# Patient Record
Sex: MALE | Race: BLACK OR AFRICAN AMERICAN | Employment: UNEMPLOYED | URBAN - METROPOLITAN AREA
[De-identification: names, ages, dates, MRNs, and addresses within clinical notes are randomized per-mention and may not be internally consistent; named-entity substitution may affect disease eponyms.]

---

## 2018-07-13 PROBLEM — K81.0 ACUTE CHOLECYSTITIS: Status: ACTIVE | Noted: 2018-07-13

## 2019-05-10 ENCOUNTER — HOSPITAL ENCOUNTER (EMERGENCY)
Age: 24
Discharge: HOME | End: 2019-05-10
Attending: EMERGENCY MEDICINE
Payer: MEDICAID

## 2019-05-10 VITALS
HEIGHT: 75 IN | TEMPERATURE: 98.2 F | OXYGEN SATURATION: 98 % | BODY MASS INDEX: 36.43 KG/M2 | DIASTOLIC BLOOD PRESSURE: 87 MMHG | RESPIRATION RATE: 18 BRPM | HEART RATE: 86 BPM | WEIGHT: 292.99 LBS | SYSTOLIC BLOOD PRESSURE: 133 MMHG

## 2019-05-10 DIAGNOSIS — J02.9 ACUTE PHARYNGITIS, UNSPECIFIED ETIOLOGY: Primary | ICD-10-CM

## 2019-05-10 DIAGNOSIS — J01.90 ACUTE NON-RECURRENT SINUSITIS, UNSPECIFIED LOCATION: ICD-10-CM

## 2019-05-10 PROCEDURE — 99282 EMERGENCY DEPT VISIT SF MDM: CPT

## 2019-05-10 RX ORDER — AZITHROMYCIN 250 MG/1
TABLET, FILM COATED ORAL
Qty: 1 PACKET | Refills: 0 | Status: SHIPPED | OUTPATIENT
Start: 2019-05-10 | End: 2019-05-14
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
## 2019-06-03 ENCOUNTER — HOSPITAL ENCOUNTER (EMERGENCY)
Age: 24
Discharge: HOME | End: 2019-06-03
Attending: EMERGENCY MEDICINE
Payer: MEDICAID

## 2019-06-03 VITALS
DIASTOLIC BLOOD PRESSURE: 85 MMHG | RESPIRATION RATE: 18 BRPM | WEIGHT: 293.21 LBS | SYSTOLIC BLOOD PRESSURE: 126 MMHG | OXYGEN SATURATION: 96 % | TEMPERATURE: 97.5 F | BODY MASS INDEX: 36.65 KG/M2 | HEART RATE: 75 BPM

## 2019-06-03 DIAGNOSIS — L23.7 POISON IVY DERMATITIS: Primary | ICD-10-CM

## 2019-06-03 PROCEDURE — 99282 EMERGENCY DEPT VISIT SF MDM: CPT

## 2019-06-03 PROCEDURE — 6370000000 HC RX 637 (ALT 250 FOR IP): Performed by: EMERGENCY MEDICINE

## 2019-06-03 RX ORDER — FAMOTIDINE 20 MG/1
20 TABLET, FILM COATED ORAL 2 TIMES DAILY
Qty: 28 TABLET | Refills: 0 | Status: SHIPPED | OUTPATIENT
Start: 2019-06-03 | End: 2019-07-08 | Stop reason: ALTCHOICE

## 2019-06-03 RX ORDER — DIPHENHYDRAMINE HCL 25 MG
25 CAPSULE ORAL EVERY 6 HOURS PRN
Qty: 12 CAPSULE | Refills: 0 | Status: SHIPPED | OUTPATIENT
Start: 2019-06-03 | End: 2019-06-13

## 2019-06-03 RX ORDER — PREDNISONE 20 MG/1
60 TABLET ORAL ONCE
Status: COMPLETED | OUTPATIENT
Start: 2019-06-03 | End: 2019-06-03

## 2019-06-03 RX ORDER — PREDNISONE 10 MG/1
TABLET ORAL
Qty: 32 TABLET | Refills: 0 | Status: SHIPPED | OUTPATIENT
Start: 2019-06-03 | End: 2019-06-13

## 2019-06-03 RX ORDER — TRIAMCINOLONE ACETONIDE 0.25 MG/G
OINTMENT TOPICAL
Qty: 1 TUBE | Refills: 1 | Status: SHIPPED | OUTPATIENT
Start: 2019-06-03 | End: 2019-06-10

## 2019-06-03 RX ADMIN — PREDNISONE 60 MG: 20 TABLET ORAL at 06:42

## 2019-06-03 ASSESSMENT — PAIN DESCRIPTION - DESCRIPTORS: DESCRIPTORS: ITCHING;BURNING

## 2019-06-03 ASSESSMENT — PAIN DESCRIPTION - LOCATION: LOCATION: ARM;FACE

## 2019-06-03 ASSESSMENT — PAIN DESCRIPTION - FREQUENCY: FREQUENCY: INTERMITTENT

## 2019-06-03 ASSESSMENT — PAIN SCALES - GENERAL
PAINLEVEL_OUTOF10: 8
PAINLEVEL_OUTOF10: 8

## 2019-06-03 NOTE — ED NOTES
Discharge instructions given voiced understanding pt request ed a work note .  And was given one     Elodia Fuentes RN  06/03/19 9734
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
## 2019-07-08 ENCOUNTER — HOSPITAL ENCOUNTER (EMERGENCY)
Age: 24
Discharge: HOME | End: 2019-07-08
Attending: EMERGENCY MEDICINE
Payer: MEDICAID

## 2019-07-08 VITALS
RESPIRATION RATE: 17 BRPM | SYSTOLIC BLOOD PRESSURE: 133 MMHG | WEIGHT: 300.71 LBS | HEIGHT: 75 IN | OXYGEN SATURATION: 96 % | TEMPERATURE: 97.7 F | HEART RATE: 65 BPM | DIASTOLIC BLOOD PRESSURE: 92 MMHG | BODY MASS INDEX: 37.39 KG/M2

## 2019-07-08 DIAGNOSIS — R21 RASH AND OTHER NONSPECIFIC SKIN ERUPTION: Primary | ICD-10-CM

## 2019-07-08 PROCEDURE — 99282 EMERGENCY DEPT VISIT SF MDM: CPT

## 2019-07-08 RX ORDER — PREDNISONE 20 MG/1
TABLET ORAL
Qty: 18 TABLET | Refills: 0 | Status: SHIPPED | OUTPATIENT
Start: 2019-07-08 | End: 2019-07-18

## 2019-07-09 ASSESSMENT — ENCOUNTER SYMPTOMS
EYE REDNESS: 0
EYE PAIN: 0
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
## 2019-12-07 ENCOUNTER — HOSPITAL ENCOUNTER (EMERGENCY)
Age: 24
Discharge: HOME | End: 2019-12-07
Attending: EMERGENCY MEDICINE

## 2019-12-07 VITALS
WEIGHT: 306.66 LBS | BODY MASS INDEX: 38.13 KG/M2 | SYSTOLIC BLOOD PRESSURE: 124 MMHG | OXYGEN SATURATION: 97 % | HEART RATE: 75 BPM | HEIGHT: 75 IN | TEMPERATURE: 98.4 F | DIASTOLIC BLOOD PRESSURE: 84 MMHG | RESPIRATION RATE: 16 BRPM

## 2019-12-07 DIAGNOSIS — J45.901 MILD ASTHMA WITH EXACERBATION, UNSPECIFIED WHETHER PERSISTENT: Primary | ICD-10-CM

## 2019-12-07 PROCEDURE — 99283 EMERGENCY DEPT VISIT LOW MDM: CPT

## 2019-12-07 RX ORDER — ALBUTEROL SULFATE 90 UG/1
2 AEROSOL, METERED RESPIRATORY (INHALATION) EVERY 6 HOURS PRN
COMMUNITY
End: 2020-03-25 | Stop reason: ALTCHOICE

## 2019-12-07 RX ORDER — ALBUTEROL SULFATE 90 UG/1
2 AEROSOL, METERED RESPIRATORY (INHALATION) EVERY 4 HOURS PRN
Qty: 1 INHALER | Refills: 0 | Status: SHIPPED | OUTPATIENT
Start: 2019-12-07 | End: 2020-03-25 | Stop reason: ALTCHOICE

## 2019-12-07 ASSESSMENT — ENCOUNTER SYMPTOMS
DIARRHEA: 0
SORE THROAT: 0
RHINORRHEA: 0
NAUSEA: 0
VOMITING: 0
WHEEZING: 0
ABDOMINAL PAIN: 0
CHEST TIGHTNESS: 1
COUGH: 1

## 2019-12-08 ENCOUNTER — APPOINTMENT (OUTPATIENT)
Dept: GENERAL RADIOLOGY | Age: 24
End: 2019-12-08

## 2019-12-08 ENCOUNTER — HOSPITAL ENCOUNTER (EMERGENCY)
Age: 24
Discharge: HOME | End: 2019-12-08
Attending: EMERGENCY MEDICINE

## 2019-12-08 VITALS
HEART RATE: 97 BPM | TEMPERATURE: 98.4 F | SYSTOLIC BLOOD PRESSURE: 121 MMHG | WEIGHT: 306 LBS | RESPIRATION RATE: 16 BRPM | BODY MASS INDEX: 38.05 KG/M2 | OXYGEN SATURATION: 98 % | HEIGHT: 75 IN | DIASTOLIC BLOOD PRESSURE: 79 MMHG

## 2019-12-08 DIAGNOSIS — J40 BRONCHITIS: Primary | ICD-10-CM

## 2019-12-08 PROCEDURE — 71046 X-RAY EXAM CHEST 2 VIEWS: CPT

## 2019-12-08 PROCEDURE — 99283 EMERGENCY DEPT VISIT LOW MDM: CPT

## 2019-12-08 PROCEDURE — 6370000000 HC RX 637 (ALT 250 FOR IP): Performed by: EMERGENCY MEDICINE

## 2019-12-08 RX ORDER — BENZONATATE 100 MG/1
100 CAPSULE ORAL 3 TIMES DAILY PRN
Qty: 30 CAPSULE | Refills: 0 | Status: SHIPPED | OUTPATIENT
Start: 2019-12-08 | End: 2019-12-15

## 2019-12-08 RX ORDER — IPRATROPIUM BROMIDE AND ALBUTEROL SULFATE 2.5; .5 MG/3ML; MG/3ML
1 SOLUTION RESPIRATORY (INHALATION) ONCE
Status: COMPLETED | OUTPATIENT
Start: 2019-12-08 | End: 2019-12-08

## 2019-12-08 RX ORDER — AZITHROMYCIN 250 MG/1
TABLET, FILM COATED ORAL
Qty: 1 PACKET | Refills: 0 | Status: SHIPPED | OUTPATIENT
Start: 2019-12-08 | End: 2019-12-12

## 2019-12-08 RX ADMIN — IPRATROPIUM BROMIDE AND ALBUTEROL SULFATE 1 AMPULE: 2.5; .5 SOLUTION RESPIRATORY (INHALATION) at 08:23

## 2019-12-08 ASSESSMENT — PAIN SCALES - GENERAL
PAINLEVEL_OUTOF10: 6
PAINLEVEL_OUTOF10: 8
PAINLEVEL_OUTOF10: 5

## 2019-12-08 ASSESSMENT — PAIN DESCRIPTION - DESCRIPTORS
DESCRIPTORS: HEAVINESS

## 2019-12-08 ASSESSMENT — ENCOUNTER SYMPTOMS
VOMITING: 0
NAUSEA: 0
ABDOMINAL PAIN: 0
CHEST TIGHTNESS: 1
SORE THROAT: 0
COUGH: 1
SHORTNESS OF BREATH: 1
DIARRHEA: 0

## 2019-12-08 ASSESSMENT — PAIN DESCRIPTION - FREQUENCY: FREQUENCY: INTERMITTENT

## 2019-12-08 ASSESSMENT — PAIN DESCRIPTION - LOCATION
LOCATION: CHEST

## 2020-03-25 ENCOUNTER — HOSPITAL ENCOUNTER (EMERGENCY)
Age: 25
Discharge: HOME | End: 2020-03-25
Attending: EMERGENCY MEDICINE

## 2020-03-25 ENCOUNTER — APPOINTMENT (OUTPATIENT)
Dept: GENERAL RADIOLOGY | Age: 25
End: 2020-03-25

## 2020-03-25 VITALS
TEMPERATURE: 98.7 F | OXYGEN SATURATION: 98 % | BODY MASS INDEX: 37.55 KG/M2 | HEIGHT: 75 IN | DIASTOLIC BLOOD PRESSURE: 88 MMHG | WEIGHT: 302.03 LBS | RESPIRATION RATE: 16 BRPM | HEART RATE: 74 BPM | SYSTOLIC BLOOD PRESSURE: 148 MMHG

## 2020-03-25 PROCEDURE — 99283 EMERGENCY DEPT VISIT LOW MDM: CPT

## 2020-03-25 PROCEDURE — 71046 X-RAY EXAM CHEST 2 VIEWS: CPT

## 2020-03-25 RX ORDER — BENZONATATE 100 MG/1
100 CAPSULE ORAL 3 TIMES DAILY PRN
Qty: 21 CAPSULE | Refills: 0 | Status: SHIPPED | OUTPATIENT
Start: 2020-03-25 | End: 2020-04-01

## 2020-03-25 RX ORDER — ALBUTEROL SULFATE 90 UG/1
2 AEROSOL, METERED RESPIRATORY (INHALATION) 4 TIMES DAILY PRN
Qty: 1 INHALER | Refills: 0 | Status: SHIPPED | OUTPATIENT
Start: 2020-03-25 | End: 2020-08-31
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
## 2020-07-27 ENCOUNTER — HOSPITAL ENCOUNTER (EMERGENCY)
Age: 25
Discharge: HOME | End: 2020-07-27
Attending: EMERGENCY MEDICINE

## 2020-07-27 VITALS
WEIGHT: 295.2 LBS | HEART RATE: 74 BPM | SYSTOLIC BLOOD PRESSURE: 135 MMHG | RESPIRATION RATE: 18 BRPM | BODY MASS INDEX: 36.9 KG/M2 | DIASTOLIC BLOOD PRESSURE: 89 MMHG | TEMPERATURE: 98.9 F | OXYGEN SATURATION: 98 %

## 2020-07-27 PROCEDURE — 99282 EMERGENCY DEPT VISIT SF MDM: CPT

## 2020-07-27 RX ORDER — PREDNISONE 10 MG/1
TABLET ORAL
Qty: 30 TABLET | Refills: 0 | Status: SHIPPED | OUTPATIENT
Start: 2020-07-27 | End: 2020-08-31
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
## 2020-07-27 NOTE — LETTER
[facility] [address]  Phone: [phone]               July 27, 2020    Patient: Drea Christensen   YOB: 1995   Date of Visit: 7/27/2020       To Whom It May Concern:    Jacquelyn Dietz was seen and treated in our emergency department on 7/27/2020. He may return to work on 7/28/20.       Sincerely,       Mark Bence, MD         Signature:__________________________________

## 2020-08-31 ENCOUNTER — HOSPITAL ENCOUNTER (EMERGENCY)
Age: 25
Discharge: HOME | End: 2020-08-31
Attending: EMERGENCY MEDICINE

## 2020-08-31 VITALS
BODY MASS INDEX: 35.56 KG/M2 | TEMPERATURE: 97.7 F | HEART RATE: 71 BPM | SYSTOLIC BLOOD PRESSURE: 130 MMHG | RESPIRATION RATE: 12 BRPM | DIASTOLIC BLOOD PRESSURE: 92 MMHG | WEIGHT: 286 LBS | OXYGEN SATURATION: 100 % | HEIGHT: 75 IN

## 2020-08-31 LAB
A/G RATIO: 1.2 (ref 1.1–2.2)
ALBUMIN SERPL-MCNC: 4.6 G/DL (ref 3.4–5)
ALP BLD-CCNC: 112 U/L (ref 40–129)
ALT SERPL-CCNC: 31 U/L (ref 10–40)
ANION GAP SERPL CALCULATED.3IONS-SCNC: 15 MMOL/L (ref 3–16)
AST SERPL-CCNC: 19 U/L (ref 15–37)
BACTERIA: ABNORMAL /HPF
BASOPHILS ABSOLUTE: 0.1 K/UL (ref 0–0.2)
BASOPHILS RELATIVE PERCENT: 0.5 %
BILIRUB SERPL-MCNC: 0.8 MG/DL (ref 0–1)
BILIRUBIN URINE: NEGATIVE
BLOOD, URINE: ABNORMAL
BUN BLDV-MCNC: 12 MG/DL (ref 7–20)
CALCIUM SERPL-MCNC: 9.8 MG/DL (ref 8.3–10.6)
CHLORIDE BLD-SCNC: 102 MMOL/L (ref 99–110)
CLARITY: ABNORMAL
CO2: 20 MMOL/L (ref 21–32)
COLOR: ABNORMAL
CREAT SERPL-MCNC: 1.1 MG/DL (ref 0.9–1.3)
EOSINOPHILS ABSOLUTE: 0.1 K/UL (ref 0–0.6)
EOSINOPHILS RELATIVE PERCENT: 0.3 %
EPITHELIAL CELLS, UA: ABNORMAL /HPF (ref 0–5)
GFR AFRICAN AMERICAN: >60
GFR NON-AFRICAN AMERICAN: >60
GLOBULIN: 3.7 G/DL
GLUCOSE BLD-MCNC: 112 MG/DL (ref 70–99)
GLUCOSE URINE: NEGATIVE MG/DL
HCT VFR BLD CALC: 46.7 % (ref 40.5–52.5)
HEMOGLOBIN: 15.9 G/DL (ref 13.5–17.5)
KETONES, URINE: NEGATIVE MG/DL
LEUKOCYTE ESTERASE, URINE: NEGATIVE
LIPASE: 40 U/L (ref 13–60)
LYMPHOCYTES ABSOLUTE: 4 K/UL (ref 1–5.1)
LYMPHOCYTES RELATIVE PERCENT: 24.7 %
MCH RBC QN AUTO: 30.2 PG (ref 26–34)
MCHC RBC AUTO-ENTMCNC: 34.1 G/DL (ref 31–36)
MCV RBC AUTO: 88.7 FL (ref 80–100)
MICROSCOPIC EXAMINATION: YES
MONOCYTES ABSOLUTE: 1.4 K/UL (ref 0–1.3)
MONOCYTES RELATIVE PERCENT: 8.4 %
NEUTROPHILS ABSOLUTE: 10.8 K/UL (ref 1.7–7.7)
NEUTROPHILS RELATIVE PERCENT: 66.1 %
NITRITE, URINE: NEGATIVE
PDW BLD-RTO: 13.7 % (ref 12.4–15.4)
PH UA: 5.5 (ref 5–8)
PLATELET # BLD: 379 K/UL (ref 135–450)
PMV BLD AUTO: 7.2 FL (ref 5–10.5)
POTASSIUM REFLEX MAGNESIUM: 3.9 MMOL/L (ref 3.5–5.1)
PROTEIN UA: NEGATIVE MG/DL
RBC # BLD: 5.27 M/UL (ref 4.2–5.9)
RBC UA: ABNORMAL /HPF (ref 0–4)
SODIUM BLD-SCNC: 137 MMOL/L (ref 136–145)
SPECIFIC GRAVITY UA: >=1.03 (ref 1–1.03)
TOTAL PROTEIN: 8.3 G/DL (ref 6.4–8.2)
URINE REFLEX TO CULTURE: ABNORMAL
URINE TRICHOMONAS EVALUATION: NORMAL
URINE TYPE: ABNORMAL
UROBILINOGEN, URINE: 0.2 E.U./DL
WBC # BLD: 16.3 K/UL (ref 4–11)
WBC UA: ABNORMAL /HPF (ref 0–5)

## 2020-08-31 PROCEDURE — 6370000000 HC RX 637 (ALT 250 FOR IP): Performed by: EMERGENCY MEDICINE

## 2020-08-31 PROCEDURE — 80053 COMPREHEN METABOLIC PANEL: CPT

## 2020-08-31 PROCEDURE — 87491 CHLMYD TRACH DNA AMP PROBE: CPT

## 2020-08-31 PROCEDURE — 81001 URINALYSIS AUTO W/SCOPE: CPT

## 2020-08-31 PROCEDURE — 96372 THER/PROPH/DIAG INJ SC/IM: CPT

## 2020-08-31 PROCEDURE — 36415 COLL VENOUS BLD VENIPUNCTURE: CPT

## 2020-08-31 PROCEDURE — 87591 N.GONORRHOEAE DNA AMP PROB: CPT

## 2020-08-31 PROCEDURE — 6360000002 HC RX W HCPCS: Performed by: EMERGENCY MEDICINE

## 2020-08-31 PROCEDURE — 83690 ASSAY OF LIPASE: CPT

## 2020-08-31 PROCEDURE — 85025 COMPLETE CBC W/AUTO DIFF WBC: CPT

## 2020-08-31 PROCEDURE — 99283 EMERGENCY DEPT VISIT LOW MDM: CPT

## 2020-08-31 RX ORDER — IBUPROFEN 600 MG/1
600 TABLET ORAL EVERY 6 HOURS PRN
Qty: 40 TABLET | Refills: 0 | Status: SHIPPED | OUTPATIENT
Start: 2020-08-31 | End: 2021-01-31

## 2020-08-31 RX ORDER — TAMSULOSIN HYDROCHLORIDE 0.4 MG/1
0.4 CAPSULE ORAL DAILY
Qty: 5 CAPSULE | Refills: 0 | Status: SHIPPED | OUTPATIENT
Start: 2020-08-31 | End: 2021-01-31

## 2020-08-31 RX ORDER — HYDROCODONE BITARTRATE AND ACETAMINOPHEN 5; 325 MG/1; MG/1
1 TABLET ORAL EVERY 6 HOURS PRN
Qty: 10 TABLET | Refills: 0 | Status: SHIPPED | OUTPATIENT
Start: 2020-08-31 | End: 2020-09-03

## 2020-08-31 RX ORDER — KETOROLAC TROMETHAMINE 30 MG/ML
30 INJECTION, SOLUTION INTRAMUSCULAR; INTRAVENOUS ONCE
Status: COMPLETED | OUTPATIENT
Start: 2020-08-31 | End: 2020-08-31

## 2020-08-31 RX ORDER — ONDANSETRON 4 MG/1
4 TABLET, ORALLY DISINTEGRATING ORAL ONCE
Status: COMPLETED | OUTPATIENT
Start: 2020-08-31 | End: 2020-08-31

## 2020-08-31 RX ORDER — HYDROCODONE BITARTRATE AND ACETAMINOPHEN 5; 325 MG/1; MG/1
1 TABLET ORAL ONCE
Status: COMPLETED | OUTPATIENT
Start: 2020-08-31 | End: 2020-08-31

## 2020-08-31 RX ADMIN — ONDANSETRON 4 MG: 4 TABLET, ORALLY DISINTEGRATING ORAL at 08:06

## 2020-08-31 RX ADMIN — KETOROLAC TROMETHAMINE 30 MG: 30 INJECTION, SOLUTION INTRAMUSCULAR at 07:27

## 2020-08-31 RX ADMIN — HYDROCODONE BITARTRATE AND ACETAMINOPHEN 1 TABLET: 5; 325 TABLET ORAL at 08:06

## 2020-08-31 ASSESSMENT — PAIN SCALES - GENERAL
PAINLEVEL_OUTOF10: 9
PAINLEVEL_OUTOF10: 2
PAINLEVEL_OUTOF10: 9
PAINLEVEL_OUTOF10: 8

## 2020-08-31 ASSESSMENT — PAIN DESCRIPTION - ORIENTATION
ORIENTATION: LEFT
ORIENTATION: LEFT

## 2020-08-31 ASSESSMENT — PAIN DESCRIPTION - LOCATION
LOCATION: GROIN
LOCATION: ABDOMEN;SCROTUM

## 2020-08-31 ASSESSMENT — PAIN DESCRIPTION - PAIN TYPE
TYPE: ACUTE PAIN
TYPE: ACUTE PAIN

## 2020-08-31 ASSESSMENT — PAIN DESCRIPTION - DESCRIPTORS: DESCRIPTORS: SHARP

## 2020-08-31 ASSESSMENT — PAIN - FUNCTIONAL ASSESSMENT: PAIN_FUNCTIONAL_ASSESSMENT: 0-10
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
## 2020-08-31 NOTE — ED NOTES
Patient states his pain is less in his side and abd, more towards his groin     Odalis Ramos RN  08/31/20 4071

## 2020-09-09 LAB
C. TRACHOMATIS DNA ,URINE: NEGATIVE
N. GONORRHOEAE DNA, URINE: NEGATIVE

## 2021-01-31 ENCOUNTER — APPOINTMENT (OUTPATIENT)
Dept: CT IMAGING | Age: 26
End: 2021-01-31

## 2021-01-31 ENCOUNTER — HOSPITAL ENCOUNTER (EMERGENCY)
Age: 26
Discharge: HOME | End: 2021-01-31
Attending: EMERGENCY MEDICINE

## 2021-01-31 VITALS
HEIGHT: 75 IN | HEART RATE: 99 BPM | TEMPERATURE: 97.6 F | RESPIRATION RATE: 12 BRPM | OXYGEN SATURATION: 99 % | BODY MASS INDEX: 37.18 KG/M2 | DIASTOLIC BLOOD PRESSURE: 80 MMHG | WEIGHT: 299 LBS | SYSTOLIC BLOOD PRESSURE: 153 MMHG

## 2021-01-31 DIAGNOSIS — S29.019A THORACIC MYOFASCIAL STRAIN, INITIAL ENCOUNTER: Primary | ICD-10-CM

## 2021-01-31 PROCEDURE — 72128 CT CHEST SPINE W/O DYE: CPT

## 2021-01-31 PROCEDURE — 99284 EMERGENCY DEPT VISIT MOD MDM: CPT

## 2021-01-31 PROCEDURE — 6370000000 HC RX 637 (ALT 250 FOR IP): Performed by: EMERGENCY MEDICINE

## 2021-01-31 RX ORDER — METHYLPREDNISOLONE 4 MG/1
TABLET ORAL
Qty: 1 KIT | Refills: 0 | Status: SHIPPED | OUTPATIENT
Start: 2021-01-31 | End: 2021-03-26 | Stop reason: ALTCHOICE

## 2021-01-31 RX ORDER — CYCLOBENZAPRINE HCL 10 MG
10 TABLET ORAL 3 TIMES DAILY PRN
Qty: 21 TABLET | Refills: 0 | Status: SHIPPED | OUTPATIENT
Start: 2021-01-31 | End: 2021-02-07

## 2021-01-31 RX ORDER — IBUPROFEN 600 MG/1
600 TABLET ORAL EVERY 6 HOURS PRN
Qty: 40 TABLET | Refills: 0 | Status: SHIPPED | OUTPATIENT
Start: 2021-01-31 | End: 2021-09-19 | Stop reason: SDUPTHER

## 2021-01-31 RX ORDER — ACETAMINOPHEN 500 MG
1000 TABLET ORAL ONCE
Status: COMPLETED | OUTPATIENT
Start: 2021-01-31 | End: 2021-01-31

## 2021-01-31 RX ADMIN — ACETAMINOPHEN 1000 MG: 500 TABLET ORAL at 15:20

## 2021-01-31 ASSESSMENT — PAIN DESCRIPTION - LOCATION
LOCATION: BACK
LOCATION: BACK

## 2021-01-31 ASSESSMENT — PAIN DESCRIPTION - PAIN TYPE: TYPE: ACUTE PAIN

## 2021-01-31 ASSESSMENT — PAIN DESCRIPTION - DESCRIPTORS: DESCRIPTORS: STABBING
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
## 2021-03-26 ENCOUNTER — HOSPITAL ENCOUNTER (EMERGENCY)
Age: 26
Discharge: HOME | End: 2021-03-26
Attending: STUDENT IN AN ORGANIZED HEALTH CARE EDUCATION/TRAINING PROGRAM

## 2021-03-26 VITALS
TEMPERATURE: 98.2 F | RESPIRATION RATE: 17 BRPM | HEIGHT: 75 IN | OXYGEN SATURATION: 98 % | DIASTOLIC BLOOD PRESSURE: 79 MMHG | SYSTOLIC BLOOD PRESSURE: 147 MMHG | WEIGHT: 311.51 LBS | HEART RATE: 83 BPM | BODY MASS INDEX: 38.73 KG/M2

## 2021-03-26 DIAGNOSIS — J20.9 ACUTE BRONCHITIS, UNSPECIFIED ORGANISM: Primary | ICD-10-CM

## 2021-03-26 PROCEDURE — 99283 EMERGENCY DEPT VISIT LOW MDM: CPT

## 2021-03-26 RX ORDER — PREDNISONE 50 MG/1
50 TABLET ORAL DAILY
Qty: 5 TABLET | Refills: 0 | Status: SHIPPED | OUTPATIENT
Start: 2021-03-26 | End: 2021-03-31

## 2021-03-26 RX ORDER — ALBUTEROL SULFATE 90 UG/1
2 AEROSOL, METERED RESPIRATORY (INHALATION) 4 TIMES DAILY PRN
Qty: 1 INHALER | Refills: 0 | Status: SHIPPED | OUTPATIENT
Start: 2021-03-26

## 2021-03-26 RX ORDER — BENZONATATE 100 MG/1
100 CAPSULE ORAL 2 TIMES DAILY PRN
Qty: 14 CAPSULE | Refills: 0 | Status: SHIPPED | OUTPATIENT
Start: 2021-03-26 | End: 2021-04-02

## 2021-03-26 NOTE — ED NOTES
AVS reviewed with patient. Verbalized understanding. AVS was printed and given to patient.        Kae Quezada RN  03/26/21 0335

## 2021-03-26 NOTE — ED PROVIDER NOTES
History:   Procedure Laterality Date    ADENOIDECTOMY  age 5    CHOLECYSTECTOMY  07/13/2018    TEAR DUCT SURGERY  1996 x 2    TONSILLECTOMY  age 5         CURRENT MEDICATIONS       Previous Medications    IBUPROFEN (ADVIL;MOTRIN) 600 MG TABLET    Take 1 tablet by mouth every 6 hours as needed for Pain       ALLERGIES     Duricef [cefadroxil monohydrate]    FAMILY HISTORY       Family History   Problem Relation Age of Onset    Arthritis Mother     Depression Mother     High Blood Pressure Mother     Mental Illness Mother     Stroke Mother     Asthma Sister     Depression Sister     Learning Disabilities Sister     Mental Illness Sister     Asthma Brother     Depression Brother     High Cholesterol Brother     Mental Illness Brother     High Cholesterol Maternal Uncle     Stroke Maternal Uncle     Arthritis Maternal Grandmother     Cancer Maternal Grandmother     High Blood Pressure Maternal Grandmother     Cancer Maternal Grandfather     High Blood Pressure Maternal Grandfather     Mental Illness Maternal Grandfather     Arthritis Paternal Grandmother     High Blood Pressure Paternal Grandmother           SOCIAL HISTORY       Social History     Socioeconomic History    Marital status: Single     Spouse name: Not on file    Number of children: Not on file    Years of education: Not on file    Highest education level: Not on file   Occupational History    Occupation: student   Social Needs    Financial resource strain: Not on file    Food insecurity     Worry: Not on file     Inability: Not on file   [employer] needs     Medical: Not on file     Non-medical: Not on file   Tobacco Use    Smoking status: Former Smoker     Types: Cigars    Smokeless tobacco: Former User   Substance and Sexual Activity    Alcohol use: Yes     Comment: rare    Drug use: Not Currently     Frequency: 20.0 times per week     Types: Marijuana     Comment: stopped 2019    Sexual activity: Yes Partners: Female   Lifestyle    Physical activity     Days per week: Not on file     Minutes per session: Not on file    Stress: Not on file   Relationships    Social connections     Talks on phone: Not on file     Gets together: Not on file     Attends Yazdanism service: Not on file     Active member of club or organization: Not on file     Attends meetings of clubs or organizations: Not on file     Relationship status: Not on file    Intimate partner violence     Fear of current or ex partner: Not on file     Emotionally abused: Not on file     Physically abused: Not on file     Forced sexual activity: Not on file   Other Topics Concern    Not on file   Social History Narrative    Not on file       SCREENINGS             PHYSICAL EXAM    (up to 7 for level 4, 8 or more for level 5)     Vitals:    21 1009   BP: (!) 147/79   Pulse: 83   Resp: 17   Temp: 98.2 °F (36.8 °C)   SpO2: 98%         General: Alert and oriented appropriately for age, No acute distress. Eye: Normal conjunctiva. Pupils equal and reactive. HENT: Oral mucosa is moist.  Respiratory: Respirations even and non-labored. Slight expiratory wheeze at the bases but overall clear entry throughout. Cardiovascular: Normal rate, Regular rhythm. Intact peripheral pulses. Gastrointestinal: Soft, Non-tender, Non-distended. Musculoskeletal: No swelling. Integumentary: Warm, Dry. Neurologic: Alert and appropriate for age. No focal deficits. Psychiatric: Cooperative. DIAGNOSTIC RESULTS         EMERGENCY DEPARTMENT COURSE and DIFFERENTIAL DIAGNOSIS/MDM:   Vitals:    Vitals:    21 1009   BP: (!) 147/79   Pulse: 83   Resp: 17   Temp: 98.2 °F (36.8 °C)   TempSrc: Oral   SpO2: 98%   Weight: (!) 311 lb 8.2 oz (141.3 kg)   Height: 6' 3\" (1.905 m)         Medical decision makin-year-old male who presents with cough and wheezing, nighttime cough for the past 2 days.   Consistent with acute viral bronchitis, given the patient's history of recurrent bronchitis, likely reactive airway disease contributing. Counseled patient on likely viral etiology of his symptoms and that he would likely feel better without antibiotics. Patient currently not feeling short of breath, lungs largely clear to auscultation in the emergency department, no distress, slight wheeze, never requiring intervention in the emergency department. Prescribed albuterol, steroids, Tessalon Perles. Patient amenable to this plan and is stable for an outpatient disposition. Given d/c instructions and return precautions, pt voices understanding. D/c home, ambulated steadily from the ED. FINAL IMPRESSION      1.  Acute bronchitis, unspecified organism          DISPOSITION/PLAN   DISPOSITION Decision To Discharge 03/26/2021 10:08:04 AM      PATIENT REFERRED TO:  [place] [address]  [phone]    If symptoms worsen      DISCHARGE MEDICATIONS:  New Prescriptions    ALBUTEROL SULFATE HFA (VENTOLIN HFA) 108 (90 BASE) MCG/ACT INHALER    Inhale 2 puffs into the lungs 4 times daily as needed for Wheezing    BENZONATATE (TESSALON) 100 MG CAPSULE    Take 1 capsule by mouth 2 times daily as needed for Cough    PREDNISONE (DELTASONE) 50 MG TABLET    Take 1 tablet by mouth daily for 5 days          (Please note that portions of this note were completed with a voice recognition program.Efforts were made to edit the dictations but occasionally words are mis-transcribed.)    Rik Christian MD (electronically signed)  Attending Emergency Physician         Rik Christian MD  03/26/21 1016

## 2021-08-31 ENCOUNTER — HOSPITAL ENCOUNTER (EMERGENCY)
Age: 26
Discharge: HOME | End: 2021-08-31
Payer: OTHER GOVERNMENT

## 2021-08-31 VITALS
OXYGEN SATURATION: 97 % | TEMPERATURE: 96.9 F | SYSTOLIC BLOOD PRESSURE: 120 MMHG | DIASTOLIC BLOOD PRESSURE: 83 MMHG | WEIGHT: 299.83 LBS | RESPIRATION RATE: 16 BRPM | BODY MASS INDEX: 37.48 KG/M2 | HEART RATE: 79 BPM

## 2021-08-31 DIAGNOSIS — U07.1 COVID-19: Primary | ICD-10-CM

## 2021-08-31 PROCEDURE — U0003 INFECTIOUS AGENT DETECTION BY NUCLEIC ACID (DNA OR RNA); SEVERE ACUTE RESPIRATORY SYNDROME CORONAVIRUS 2 (SARS-COV-2) (CORONAVIRUS DISEASE [COVID-19]), AMPLIFIED PROBE TECHNIQUE, MAKING USE OF HIGH THROUGHPUT TECHNOLOGIES AS DESCRIBED BY CMS-2020-01-R: HCPCS

## 2021-08-31 PROCEDURE — U0005 INFEC AGEN DETEC AMPLI PROBE: HCPCS

## 2021-08-31 PROCEDURE — 99283 EMERGENCY DEPT VISIT LOW MDM: CPT

## 2021-08-31 RX ORDER — METHYLPREDNISOLONE 4 MG/1
4 TABLET ORAL SEE ADMIN INSTRUCTIONS
Qty: 1 KIT | Refills: 0 | Status: SHIPPED | OUTPATIENT
Start: 2021-08-31 | End: 2021-09-06

## 2021-08-31 RX ORDER — ACETAMINOPHEN 500 MG
500 TABLET ORAL EVERY 4 HOURS PRN
Qty: 20 TABLET | Refills: 0 | Status: SHIPPED | OUTPATIENT
Start: 2021-08-31 | End: 2021-09-19 | Stop reason: SDUPTHER

## 2021-08-31 RX ORDER — ASCORBIC ACID 500 MG
500 TABLET ORAL 2 TIMES DAILY
Qty: 14 TABLET | Refills: 0 | Status: SHIPPED | OUTPATIENT
Start: 2021-08-31 | End: 2021-09-07

## 2021-08-31 ASSESSMENT — ENCOUNTER SYMPTOMS
VOMITING: 0
SORE THROAT: 0
NAUSEA: 0
SHORTNESS OF BREATH: 0
ABDOMINAL PAIN: 0
BACK PAIN: 0

## 2021-08-31 ASSESSMENT — PAIN DESCRIPTION - LOCATION: LOCATION: HEAD

## 2021-08-31 ASSESSMENT — PAIN SCALES - GENERAL: PAINLEVEL_OUTOF10: 5

## 2021-08-31 NOTE — ED TRIAGE NOTES
Cough starting Sunday with white colored sputum. Yesterday started with headache, took otc covid test that was positive.  \"I wanted to make sure that the test was correct\"
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
## 2021-09-01 ENCOUNTER — CARE COORDINATION (OUTPATIENT)
Dept: CARE COORDINATION | Age: 26
End: 2021-09-01

## 2021-09-01 LAB — SARS-COV-2: DETECTED

## 2021-09-01 NOTE — CARE COORDINATION
Patient contacted regarding COVID-19 diagnosis. Discussed COVID-19 related testing which was available at this time. Test results were positive. Patient informed of results, if available? Yes. New Medications:  acetaminophen (TYLENOL) 500 MG tablet 20 tablet 0 2021 9/10/2021    Sig - Route: Take 1 tablet by mouth every 4 hours as needed for Pain or Fever - Oral      ascorbic acid (VITAMIN C) 500 MG tablet 14 tablet 0 2021    Sig - Route: Take 1 tablet by mouth 2 times daily for 7 days - Oral      methylPREDNISolone (MEDROL, DINORA,) 4 MG tablet 1 kit 0 2021    Sig - Route: Take 1 tablet by mouth See Admin Instructions for 6 days Take by mouth. - Oral        Ambulatory Care Manager contacted the patient by telephone to perform post discharge assessment. Call within 2 business days of discharge: Yes. Verified name and  with patient as identifiers. Provided introduction to self, and explanation of the CTN/ACM role, and reason for call due to risk factors for infection and/or exposure to COVID-19. Symptoms reviewed with patient who verbalized the following symptoms: fatigue, pain or aching joints, chills or shaking, sweating, nausea, diarrhea, dizziness/lightheadedness, cold, clammy and pale skin, no new symptoms, no worsening symptoms and headache. Due to no new or worsening symptoms encounter was not routed to provider for escalation. Discussed follow-up appointments. If no appointment was previously scheduled, appointment scheduling offered: Pt was given new provider line, [phone] for follow up. Non-face-to-face services provided:  Obtained and reviewed discharge summary and/or continuity of care documents     Advance Care Planning:   Does patient have an Advance Directive:  decision maker updated. Educated patient about risk for severe COVID-19 due to risk factors according to CDC guidelines.  ACM reviewed discharge instructions, medical action plan and red flag symptoms with the patient who verbalized understanding. Discussed COVID vaccination status: Yes. Education provided on COVID-19 vaccination as appropriate. Discussed exposure protocols and quarantine with CDC Guidelines. Patient was given an opportunity to verbalize any questions and concerns and agrees to contact ACM or health care provider for questions related to their healthcare. Reviewed and educated patient on any new and changed medications related to discharge diagnosis     Was patient discharged with a pulse oximeter? No Discussed and confirmed pulse oximeter discharge instructions and when to notify provider or seek emergency care. ACM provided contact information. No further follow-up call identified based on severity of symptoms and risk factors.

## 2021-09-19 ENCOUNTER — HOSPITAL ENCOUNTER (EMERGENCY)
Age: 26
Discharge: HOME | End: 2021-09-19
Attending: EMERGENCY MEDICINE

## 2021-09-19 ENCOUNTER — APPOINTMENT (OUTPATIENT)
Dept: GENERAL RADIOLOGY | Age: 26
End: 2021-09-19

## 2021-09-19 VITALS
HEIGHT: 75 IN | HEART RATE: 88 BPM | RESPIRATION RATE: 15 BRPM | WEIGHT: 292.33 LBS | SYSTOLIC BLOOD PRESSURE: 122 MMHG | DIASTOLIC BLOOD PRESSURE: 78 MMHG | OXYGEN SATURATION: 99 % | BODY MASS INDEX: 36.35 KG/M2 | TEMPERATURE: 98 F

## 2021-09-19 DIAGNOSIS — M79.672 ACUTE FOOT PAIN, LEFT: Primary | ICD-10-CM

## 2021-09-19 PROCEDURE — 99283 EMERGENCY DEPT VISIT LOW MDM: CPT

## 2021-09-19 PROCEDURE — 73630 X-RAY EXAM OF FOOT: CPT

## 2021-09-19 RX ORDER — ACETAMINOPHEN 500 MG
500 TABLET ORAL EVERY 4 HOURS PRN
Qty: 20 TABLET | Refills: 0 | Status: SHIPPED | OUTPATIENT
Start: 2021-09-19 | End: 2021-09-29

## 2021-09-19 RX ORDER — IBUPROFEN 600 MG/1
600 TABLET ORAL EVERY 6 HOURS PRN
Qty: 40 TABLET | Refills: 0 | Status: SHIPPED | OUTPATIENT
Start: 2021-09-19

## 2021-09-19 ASSESSMENT — PAIN DESCRIPTION - PAIN TYPE: TYPE: ACUTE PAIN

## 2021-09-19 ASSESSMENT — PAIN DESCRIPTION - LOCATION: LOCATION: FOOT

## 2021-09-19 ASSESSMENT — PAIN DESCRIPTION - ORIENTATION: ORIENTATION: LEFT

## 2021-09-19 ASSESSMENT — PAIN SCALES - GENERAL: PAINLEVEL_OUTOF10: 8

## 2021-09-19 NOTE — ED NOTES
Pt here with CC of two days of left foot pain planter side from base of great toe back towards the heel. He is ambulatory but does have a little discomfort with ambulation. He did take some Ibuprofen at home PTA that did help slightly with the discomfort.       Sidney Ocasio RN  09/19/21 [id number]
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
## 2023-12-07 ENCOUNTER — HOSPITAL ENCOUNTER (EMERGENCY)
Age: 28
Discharge: HOME | End: 2023-12-07
Attending: EMERGENCY MEDICINE

## 2023-12-07 ENCOUNTER — APPOINTMENT (OUTPATIENT)
Dept: GENERAL RADIOLOGY | Age: 28
End: 2023-12-07

## 2023-12-07 VITALS
HEART RATE: 76 BPM | SYSTOLIC BLOOD PRESSURE: 107 MMHG | WEIGHT: 263 LBS | BODY MASS INDEX: 32.7 KG/M2 | DIASTOLIC BLOOD PRESSURE: 64 MMHG | HEIGHT: 75 IN | TEMPERATURE: 98 F | OXYGEN SATURATION: 100 % | RESPIRATION RATE: 22 BRPM

## 2023-12-07 DIAGNOSIS — U07.1 COVID-19: Primary | ICD-10-CM

## 2023-12-07 DIAGNOSIS — E86.0 DEHYDRATION: ICD-10-CM

## 2023-12-07 LAB
ANION GAP SERPL CALC-SCNC: 18 MEQ/L (ref 8–16)
BASOPHILS ABSOLUTE: 0 THOU/MM3 (ref 0–0.1)
BASOPHILS NFR BLD AUTO: 0.3 %
BUN SERPL-MCNC: 17 MG/DL (ref 7–22)
CALCIUM SERPL-MCNC: 9.6 MG/DL (ref 8.5–10.5)
CHLORIDE SERPL-SCNC: 95 MEQ/L (ref 98–111)
CO2 SERPL-SCNC: 22 MEQ/L (ref 23–33)
CREAT SERPL-MCNC: 1.4 MG/DL (ref 0.4–1.2)
DEPRECATED RDW RBC AUTO: 37.8 FL (ref 35–45)
EKG ATRIAL RATE: 69 BPM
EKG P AXIS: 44 DEGREES
EKG P-R INTERVAL: 126 MS
EKG Q-T INTERVAL: 384 MS
EKG QRS DURATION: 78 MS
EKG QTC CALCULATION (BAZETT): 411 MS
EKG R AXIS: 67 DEGREES
EKG T AXIS: 53 DEGREES
EKG VENTRICULAR RATE: 69 BPM
EOSINOPHIL NFR BLD AUTO: 0 %
EOSINOPHILS ABSOLUTE: 0 THOU/MM3 (ref 0–0.4)
ERYTHROCYTE [DISTWIDTH] IN BLOOD BY AUTOMATED COUNT: 12.3 % (ref 11.5–14.5)
GFR SERPL CREATININE-BSD FRML MDRD: > 60 ML/MIN/1.73M2
GLUCOSE SERPL-MCNC: 100 MG/DL (ref 70–108)
HCT VFR BLD AUTO: 52 % (ref 42–52)
HGB BLD-MCNC: 18.3 GM/DL (ref 14–18)
IMM GRANULOCYTES # BLD AUTO: 0.02 THOU/MM3 (ref 0–0.07)
IMM GRANULOCYTES NFR BLD AUTO: 0.3 %
LYMPHOCYTES ABSOLUTE: 1.7 THOU/MM3 (ref 1–4.8)
LYMPHOCYTES NFR BLD AUTO: 22.3 %
MCH RBC QN AUTO: 29.9 PG (ref 26–33)
MCHC RBC AUTO-ENTMCNC: 35.2 GM/DL (ref 32.2–35.5)
MCV RBC AUTO: 85 FL (ref 80–94)
MONOCYTES ABSOLUTE: 1.4 THOU/MM3 (ref 0.4–1.3)
MONOCYTES NFR BLD AUTO: 18.9 %
NEUTROPHILS NFR BLD AUTO: 58.2 %
NRBC BLD AUTO-RTO: 0 /100 WBC
OSMOLALITY SERPL CALC.SUM OF ELEC: 271.7 MOSMOL/KG (ref 275–300)
PLATELET # BLD AUTO: 303 THOU/MM3 (ref 130–400)
PMV BLD AUTO: 9 FL (ref 9.4–12.4)
POTASSIUM SERPL-SCNC: 4.3 MEQ/L (ref 3.5–5.2)
RBC # BLD AUTO: 6.12 MILL/MM3 (ref 4.7–6.1)
SEGMENTED NEUTROPHILS ABSOLUTE COUNT: 4.4 THOU/MM3 (ref 1.8–7.7)
SODIUM SERPL-SCNC: 135 MEQ/L (ref 135–145)
WBC # BLD AUTO: 7.5 THOU/MM3 (ref 4.8–10.8)

## 2023-12-07 PROCEDURE — 6370000000 HC RX 637 (ALT 250 FOR IP): Performed by: EMERGENCY MEDICINE

## 2023-12-07 PROCEDURE — 2580000003 HC RX 258: Performed by: EMERGENCY MEDICINE

## 2023-12-07 PROCEDURE — 71045 X-RAY EXAM CHEST 1 VIEW: CPT

## 2023-12-07 PROCEDURE — 99285 EMERGENCY DEPT VISIT HI MDM: CPT

## 2023-12-07 PROCEDURE — 80048 BASIC METABOLIC PNL TOTAL CA: CPT

## 2023-12-07 PROCEDURE — 96361 HYDRATE IV INFUSION ADD-ON: CPT

## 2023-12-07 PROCEDURE — 96360 HYDRATION IV INFUSION INIT: CPT

## 2023-12-07 PROCEDURE — 36415 COLL VENOUS BLD VENIPUNCTURE: CPT

## 2023-12-07 PROCEDURE — 93010 ELECTROCARDIOGRAM REPORT: CPT | Performed by: INTERNAL MEDICINE

## 2023-12-07 PROCEDURE — 93005 ELECTROCARDIOGRAM TRACING: CPT | Performed by: EMERGENCY MEDICINE

## 2023-12-07 PROCEDURE — 85025 COMPLETE CBC W/AUTO DIFF WBC: CPT

## 2023-12-07 RX ORDER — SODIUM CHLORIDE, SODIUM LACTATE, POTASSIUM CHLORIDE, AND CALCIUM CHLORIDE .6; .31; .03; .02 G/100ML; G/100ML; G/100ML; G/100ML
1000 INJECTION, SOLUTION INTRAVENOUS ONCE
Status: COMPLETED | OUTPATIENT
Start: 2023-12-07 | End: 2023-12-07

## 2023-12-07 RX ORDER — ACETAMINOPHEN 500 MG
1000 TABLET ORAL ONCE
Status: COMPLETED | OUTPATIENT
Start: 2023-12-07 | End: 2023-12-07

## 2023-12-07 RX ADMIN — SODIUM CHLORIDE, POTASSIUM CHLORIDE, SODIUM LACTATE AND CALCIUM CHLORIDE 1000 ML: 600; 310; 30; 20 INJECTION, SOLUTION INTRAVENOUS at 16:29

## 2023-12-07 RX ADMIN — ACETAMINOPHEN 1000 MG: 500 TABLET ORAL at 17:53

## 2023-12-07 RX ADMIN — SODIUM CHLORIDE, POTASSIUM CHLORIDE, SODIUM LACTATE AND CALCIUM CHLORIDE 1000 ML: 600; 310; 30; 20 INJECTION, SOLUTION INTRAVENOUS at 14:14

## 2023-12-07 ASSESSMENT — PAIN DESCRIPTION - LOCATION: LOCATION: HEAD

## 2023-12-07 ASSESSMENT — PAIN SCALES - GENERAL: PAINLEVEL_OUTOF10: 8

## 2023-12-07 NOTE — ED NOTES
Pt to ED via intake with c/o being + for covid. Pt reports they feel weak and dizzy. Pt was able to ambulate from intake to room 5 with on o2 saturation of 100%. Pt denies chest pain. Pt reports worsening SOB.VSS. EKG completed. IV inserted.       Casi Bright, RN  12/07/23 3060

## 2023-12-07 NOTE — DISCHARGE INSTRUCTIONS
Continue self quarantine for the next 5 days. Return to the ED if you have any new or changing symptoms such as unable tolerate oral fluids, fever, abdominal pain, chest pain, shortness of breath, or you have any other concerns. Continue to drink at least 4-16.9 ounce bottles of water per day. Drink 1-2 sports drinks to help with electrolyte replacement.
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
## 2024-08-15 ENCOUNTER — HOSPITAL ENCOUNTER (EMERGENCY)
Age: 29
Discharge: HOME | End: 2024-08-15

## 2024-08-15 VITALS
HEIGHT: 75 IN | RESPIRATION RATE: 16 BRPM | BODY MASS INDEX: 34.19 KG/M2 | TEMPERATURE: 96.6 F | DIASTOLIC BLOOD PRESSURE: 83 MMHG | WEIGHT: 275 LBS | OXYGEN SATURATION: 97 % | SYSTOLIC BLOOD PRESSURE: 128 MMHG | HEART RATE: 75 BPM

## 2024-08-15 DIAGNOSIS — L23.7 POISON IVY DERMATITIS: Primary | ICD-10-CM

## 2024-08-15 PROCEDURE — 99213 OFFICE O/P EST LOW 20 MIN: CPT

## 2024-08-15 PROCEDURE — 96372 THER/PROPH/DIAG INJ SC/IM: CPT

## 2024-08-15 PROCEDURE — 6360000002 HC RX W HCPCS

## 2024-08-15 RX ORDER — METHYLPREDNISOLONE ACETATE 80 MG/ML
80 INJECTION, SUSPENSION INTRA-ARTICULAR; INTRALESIONAL; INTRAMUSCULAR; SOFT TISSUE ONCE
Status: COMPLETED | OUTPATIENT
Start: 2024-08-15 | End: 2024-08-15

## 2024-08-15 RX ORDER — BETAMETHASONE DIPROPIONATE 0.5 MG/G
CREAM TOPICAL
Qty: 50 G | Refills: 0 | Status: SHIPPED | OUTPATIENT
Start: 2024-08-15 | End: 2024-09-14

## 2024-08-15 RX ADMIN — METHYLPREDNISOLONE ACETATE 80 MG: 80 INJECTION, SUSPENSION INTRA-ARTICULAR; INTRALESIONAL; INTRAMUSCULAR; SOFT TISSUE at 12:18

## 2024-08-15 ASSESSMENT — PAIN - FUNCTIONAL ASSESSMENT: PAIN_FUNCTIONAL_ASSESSMENT: NONE - DENIES PAIN

## 2024-08-15 NOTE — DISCHARGE INSTRUCTIONS
This rash is most likely caused from poison ivy dermatitis.  I have treated you today with Depo-Medrol shot, this shot could last up to a week.  Rarely poison ivy symptoms will last longer than a week he may have recurrence of symptoms.  If this occurs return to urgent care/ER or see your family doctor.    Rarely you could have an infection from inoculating bacteria and broken skin blister.  If you have signs of infection such as fever/chills or drainage from any of the areas or itching please attend ER for evaluation.    If symptoms fail to improve or worsen please attend ER.    If you know you encounter poison ivy in the future please wash the area with soap and water extensively immediately following her contact.    I hope you are feeling better soon!

## 2024-08-15 NOTE — ED PROVIDER NOTES
Cholecystectomy (07/13/2018).  CURRENT MEDICATIONS       Previous Medications    ACETAMINOPHEN (TYLENOL) 500 MG TABLET    Take 1 tablet by mouth every 4 hours as needed for Pain or Fever    ALBUTEROL SULFATE HFA (VENTOLIN HFA) 108 (90 BASE) MCG/ACT INHALER    Inhale 2 puffs into the lungs 4 times daily as needed for Wheezing    ASCORBIC ACID (VITAMIN C) 500 MG TABLET    Take 1 tablet by mouth 2 times daily for 7 days     ALLERGIES     Patient is is allergic to duricef [cefadroxil monohydrate].  Patients   Immunization History   Administered Date(s) Administered    Hib, unspecified 04/09/2013    Meningococcal MPSV4 (Menomune) 04/09/2013    Pneumococcal, PPSV23, PNEUMOVAX 23, (age 2y+), SC/IM, 0.5mL 04/09/2013     FAMILY HISTORY     Patient's family history includes Arthritis in his maternal grandmother, mother, and paternal grandmother; Asthma in his brother and sister; Cancer in his maternal grandfather and maternal grandmother; Depression in his brother, mother, and sister; High Blood Pressure in his maternal grandfather, maternal grandmother, mother, and paternal grandmother; High Cholesterol in his brother and maternal uncle; Learning Disabilities in his sister; Mental Illness in his brother, maternal grandfather, mother, and sister; Stroke in his maternal uncle and mother.  SOCIAL HISTORY     Patient  reports that he has quit smoking. His smoking use included cigars. He has quit using smokeless tobacco. He reports current alcohol use. He reports that he does not currently use drugs after having used the following drugs: Marijuana (Weed). Frequency: 20.00 times per week.  PHYSICAL EXAM     ED TRIAGE VITALS  BP: 127/87, Temp: (!) 96.6 °F (35.9 °C), Pulse: 73, Respirations: 16, SpO2: 97 %,Estimated body mass index is 34.37 kg/m² as calculated from the following:    Height as of this encounter: 1.905 m (6' 3\").    Weight as of this encounter: 124.7 kg (275 lb).,No LMP for male patient.  Physical Exam  Vitals and

## 2025-07-31 ENCOUNTER — HOSPITAL ENCOUNTER (EMERGENCY)
Age: 30
Discharge: HOME | End: 2025-07-31

## 2025-07-31 VITALS
SYSTOLIC BLOOD PRESSURE: 149 MMHG | RESPIRATION RATE: 16 BRPM | DIASTOLIC BLOOD PRESSURE: 92 MMHG | TEMPERATURE: 98.2 F | OXYGEN SATURATION: 99 % | HEART RATE: 85 BPM

## 2025-07-31 DIAGNOSIS — L23.7 CONTACT DERMATITIS DUE TO POISON IVY: ICD-10-CM

## 2025-07-31 DIAGNOSIS — B35.6 TINEA CRURIS: Primary | ICD-10-CM

## 2025-07-31 PROCEDURE — 99213 OFFICE O/P EST LOW 20 MIN: CPT

## 2025-07-31 RX ORDER — KETOCONAZOLE 20 MG/G
CREAM TOPICAL
Qty: 30 G | Refills: 1 | Status: SHIPPED | OUTPATIENT
Start: 2025-07-31

## 2025-07-31 ASSESSMENT — ENCOUNTER SYMPTOMS
GASTROINTESTINAL NEGATIVE: 1
ALLERGIC/IMMUNOLOGIC NEGATIVE: 1
EYES NEGATIVE: 1
RESPIRATORY NEGATIVE: 1

## 2025-07-31 NOTE — ED TRIAGE NOTES
Pt to  with c/o poison ivy from last Saturday. Pt reports he went to [facility] 5 days ago and they gave him a shot and 5 day course of steroids. Pt reports completing those but rash has spread.
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
## 2025-07-31 NOTE — DISCHARGE INSTRUCTIONS
Medications as prescribed.  Keep areas clean and dry.  Can use over-the-counter Zanfel for poison ivy.  Follow-up with family doctor in 3 to 5 days.  Return for new or worsening symptoms.